# Patient Record
Sex: MALE | Race: ASIAN | NOT HISPANIC OR LATINO | URBAN - METROPOLITAN AREA
[De-identification: names, ages, dates, MRNs, and addresses within clinical notes are randomized per-mention and may not be internally consistent; named-entity substitution may affect disease eponyms.]

---

## 2017-09-13 ENCOUNTER — EMERGENCY (EMERGENCY)
Facility: HOSPITAL | Age: 37
LOS: 1 days | Discharge: HOME | End: 2017-09-13
Attending: EMERGENCY MEDICINE
Payer: COMMERCIAL

## 2017-09-13 VITALS
OXYGEN SATURATION: 98 % | HEIGHT: 68 IN | DIASTOLIC BLOOD PRESSURE: 110 MMHG | TEMPERATURE: 98 F | RESPIRATION RATE: 18 BRPM | SYSTOLIC BLOOD PRESSURE: 159 MMHG | HEART RATE: 75 BPM | WEIGHT: 175.05 LBS

## 2017-09-13 DIAGNOSIS — S00.81XA ABRASION OF OTHER PART OF HEAD, INITIAL ENCOUNTER: ICD-10-CM

## 2017-09-13 DIAGNOSIS — S80.211A ABRASION, RIGHT KNEE, INITIAL ENCOUNTER: ICD-10-CM
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
## 2017-09-13 DIAGNOSIS — S60.512A ABRASION OF LEFT HAND, INITIAL ENCOUNTER: ICD-10-CM

## 2017-09-13 DIAGNOSIS — S60.511A ABRASION OF RIGHT HAND, INITIAL ENCOUNTER: ICD-10-CM
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
## 2017-09-13 PROCEDURE — 99284 EMERGENCY DEPT VISIT MOD MDM: CPT

## 2017-09-13 PROCEDURE — 70486 CT MAXILLOFACIAL W/O DYE: CPT | Mod: 26

## 2017-09-13 PROCEDURE — 70450 CT HEAD/BRAIN W/O DYE: CPT | Mod: 26

## 2017-09-13 NOTE — ED ADULT TRIAGE NOTE - CHIEF COMPLAINT QUOTE
as per pt tripped and fell tonight,fell face down,abrasions on right side of face and nose,right hand abrasions and left knee abarions, loose tooth no loc,dizziness

## 2017-09-13 NOTE — ED PROVIDER NOTE - PHYSICAL EXAMINATION
Afebrile, hemodynamically stable  NAD, well appearing  Abrasion to R forehead and R cheek  No CTL spine TTP  EOMI, anicteric, no TTP over orbital rims, PERRL  MMM, noted tooth #8 chip without enamel/dentin visible, TTP of that tooth without mobility  RRR, nml S1/S2, no m/r/g  Lungs CTAB, no w/r/r  Abd soft, NT, ND, nml BS, no rebound or guarding  AAO, CN's 3-12 intact, motor 5/5 and sens symmetric in all extrems, nml gait, F-N nml  MARTINEZ spontaneously, no leg cyanosis or edema. R knuckle abrasions, L knee abrasion. Full ROM/motor/sensation of all RUE and LLE.  Skin warm, well perfused, no rashes or hives

## 2017-09-13 NOTE — ED PROVIDER NOTE - MEDICAL DECISION MAKING DETAILS
Fall from standing. Abrasions to face, no deformity and CT head/face unremarkable. No focal neuro deficits, nml gait. No sign of entrapment. Full ROM/neurovasculature of b/l UE's and LE's. Noted type I dental fracture and no apparent dislocation. Given Tdap, discharged with instructions for dentist f/u in the AM and PMD f/u.   Of note, accepted HIV testing but due to prolonged time before collection stated would no longer like it to be drawn.

## 2017-09-13 NOTE — ED PROVIDER NOTE - OBJECTIVE STATEMENT
36 y/o M pt w/ no significant PMHx presents to ED c/o headache and abrasions s/p mechanical fall at 19:30 today. Pt states that he tripped and fell on uneven concrete; pt hit his face on the concrete along w/ his L knee and b/l hands. Pt has abrasions to his R face, R hand, L hand, and L knee. Pt denies LOC, dizziness, vomiting, or any other complaints. NKDA. 38 y/o M pt w/ no significant PMHx presents to ED c/o headache and abrasions s/p mechanical fall at 19:30 today. Pt states that he tripped and fell on uneven concrete; pt hit his face on the concrete along w/ his L knee and b/l hands. Pt has abrasions to his R face, R hand, L hand, and L knee. Had dizziness when fell which is absent now. Pt denies LOC, blurry vision, focal weakness, vomiting, or any other complaints. NKDA.

## 2017-09-13 NOTE — ED PROVIDER NOTE - CARE PLAN
Principal Discharge DX:	Fall, initial encounter  Secondary Diagnosis:	Facial abrasion, initial encounter  Secondary Diagnosis:	Abrasion of hand, left, initial encounter

## 2017-09-14 PROCEDURE — 90471 IMMUNIZATION ADMIN: CPT

## 2017-09-14 PROCEDURE — 90715 TDAP VACCINE 7 YRS/> IM: CPT

## 2017-09-14 PROCEDURE — 70486 CT MAXILLOFACIAL W/O DYE: CPT

## 2017-09-14 PROCEDURE — 70450 CT HEAD/BRAIN W/O DYE: CPT

## 2017-09-14 PROCEDURE — 99284 EMERGENCY DEPT VISIT MOD MDM: CPT | Mod: 25

## 2017-09-14 RX ORDER — TETANUS TOXOID, REDUCED DIPHTHERIA TOXOID AND ACELLULAR PERTUSSIS VACCINE, ADSORBED 5; 2.5; 8; 8; 2.5 [IU]/.5ML; [IU]/.5ML; UG/.5ML; UG/.5ML; UG/.5ML
0.5 SUSPENSION INTRAMUSCULAR ONCE
Qty: 0 | Refills: 0 | Status: COMPLETED | OUTPATIENT
Start: 2017-09-14 | End: 2017-09-14

## 2017-09-14 RX ADMIN — TETANUS TOXOID, REDUCED DIPHTHERIA TOXOID AND ACELLULAR PERTUSSIS VACCINE, ADSORBED 0.5 MILLILITER(S): 5; 2.5; 8; 8; 2.5 SUSPENSION INTRAMUSCULAR at 00:13

## 2022-08-23 ENCOUNTER — APPOINTMENT (OUTPATIENT)
Dept: HEPATOLOGY | Facility: CLINIC | Age: 42
End: 2022-08-23

## 2022-08-23 VITALS
BODY MASS INDEX: 30.31 KG/M2 | RESPIRATION RATE: 16 BRPM | DIASTOLIC BLOOD PRESSURE: 119 MMHG | HEART RATE: 82 BPM | OXYGEN SATURATION: 94 % | TEMPERATURE: 97.7 F | HEIGHT: 68 IN | WEIGHT: 200 LBS | SYSTOLIC BLOOD PRESSURE: 184 MMHG

## 2022-08-23 DIAGNOSIS — E80.6 OTHER DISORDERS OF BILIRUBIN METABOLISM: ICD-10-CM

## 2022-08-23 DIAGNOSIS — Z00.00 ENCOUNTER FOR GENERAL ADULT MEDICAL EXAMINATION W/OUT ABNORMAL FINDINGS: ICD-10-CM

## 2022-08-23 DIAGNOSIS — E66.9 OBESITY, UNSPECIFIED: ICD-10-CM

## 2022-08-23 DIAGNOSIS — M10.9 GOUT, UNSPECIFIED: ICD-10-CM

## 2022-08-23 DIAGNOSIS — I10 ESSENTIAL (PRIMARY) HYPERTENSION: ICD-10-CM

## 2022-08-23 DIAGNOSIS — R73.03 PREDIABETES.: ICD-10-CM

## 2022-08-23 DIAGNOSIS — R76.8 OTHER SPECIFIED ABNORMAL IMMUNOLOGICAL FINDINGS IN SERUM: ICD-10-CM

## 2022-08-23 PROCEDURE — 99204 OFFICE O/P NEW MOD 45 MIN: CPT

## 2022-08-30 PROBLEM — R76.8 AUTOANTIBODY TITER POSITIVE: Status: ACTIVE | Noted: 2022-08-23

## 2022-08-30 PROBLEM — R73.03 PREDIABETES: Status: ACTIVE | Noted: 2022-08-30

## 2022-08-30 LAB
A1AT PHENOTYP SERPL-IMP: NORMAL
A1AT SERPL-MCNC: 131 MG/DL
AFP-TM SERPL-MCNC: 2.2 NG/ML
ALBUMIN SERPL ELPH-MCNC: 5 G/DL
ALP BLD-CCNC: 90 U/L
ALT SERPL-CCNC: 107 U/L
ANA PAT FLD IF-IMP: ABNORMAL
ANA SER IF-ACNC: ABNORMAL
ANION GAP SERPL CALC-SCNC: 17 MMOL/L
AST SERPL-CCNC: 42 U/L
BASOPHILS # BLD AUTO: 0.05 K/UL
BASOPHILS NFR BLD AUTO: 0.8 %
BILIRUB DIRECT SERPL-MCNC: 0.3 MG/DL
BILIRUB SERPL-MCNC: 1.7 MG/DL
BUN SERPL-MCNC: 15 MG/DL
CALCIUM SERPL-MCNC: 9.6 MG/DL
CHLORIDE SERPL-SCNC: 105 MMOL/L
CO2 SERPL-SCNC: 22 MMOL/L
CREAT SERPL-MCNC: 1.11 MG/DL
DEPRECATED KAPPA LC FREE/LAMBDA SER: 1.17 RATIO
EGFR: 86 ML/MIN/1.73M2
EOSINOPHIL # BLD AUTO: 0.21 K/UL
EOSINOPHIL NFR BLD AUTO: 3.5 %
FERRITIN SERPL-MCNC: 160 NG/ML
FULL GENE SEQUENCE RESULT: NORMAL
GLUCOSE SERPL-MCNC: 113 MG/DL
HBV CORE IGG+IGM SER QL: NONREACTIVE
HBV SURFACE AB SERPL IA-ACNC: <3 MIU/ML
HBV SURFACE AG SER QL: NONREACTIVE
HCT VFR BLD CALC: 47.5 %
HCV AB SER QL: NONREACTIVE
HCV S/CO RATIO: 0.1 S/CO
HEPATITIS A IGG ANTIBODY: REACTIVE
HGB BLD-MCNC: 16.9 G/DL
IGA SER QL IEP: 194 MG/DL
IGG SER QL IEP: 1083 MG/DL
IGM SER QL IEP: 129 MG/DL
IMM GRANULOCYTES NFR BLD AUTO: 0.3 %
INR PPP: 0.98 RATIO
INTERPRETATION PGDFRB: NORMAL
IRON SATN MFR SERPL: 25 %
IRON SERPL-MCNC: 79 UG/DL
KAPPA LC CSF-MCNC: 1.37 MG/DL
KAPPA LC SERPL-MCNC: 1.6 MG/DL
LKM AB SER QL IF: <20.1 UNITS
LYMPHOCYTES # BLD AUTO: 2.88 K/UL
LYMPHOCYTES NFR BLD AUTO: 48.1 %
Lab: NORMAL
MAN DIFF?: NORMAL
MCHC RBC-ENTMCNC: 30.2 PG
MCHC RBC-ENTMCNC: 35.6 GM/DL
MCV RBC AUTO: 84.8 FL
MONOCYTES # BLD AUTO: 0.33 K/UL
MONOCYTES NFR BLD AUTO: 5.5 %
NEUTROPHILS # BLD AUTO: 2.5 K/UL
NEUTROPHILS NFR BLD AUTO: 41.8 %
PLATELET # BLD AUTO: 265 K/UL
POTASSIUM SERPL-SCNC: 4.1 MMOL/L
PROT SERPL-MCNC: 7.5 G/DL
PT BLD: 11.4 SEC
RBC # BLD: 5.6 M/UL
RBC # FLD: 12.9 %
REF LAB TEST METHOD: NORMAL
REVIEWED BY: NORMAL
SMOOTH MUSCLE AB SER QL IF: NORMAL
SODIUM SERPL-SCNC: 144 MMOL/L
SOLUBLE LIVER IGG SER IA-ACNC: 0.7
TA REPEAT RESULT: NORMAL
TEST PERFORMANCE INFO SPEC: NORMAL
TIBC SERPL-MCNC: 318 UG/DL
UIBC SERPL-MCNC: 239 UG/DL
WBC # FLD AUTO: 5.99 K/UL

## 2022-08-30 RX ORDER — METOPROLOL SUCCINATE 25 MG/1
25 TABLET, EXTENDED RELEASE ORAL DAILY
Refills: 0 | Status: ACTIVE | COMMUNITY

## 2022-08-30 NOTE — ASSESSMENT
[FreeTextEntry1] : 43 yo M with obesity, hypertension, prediabetes (with HbA1c 5.7% in 11/2021), gout (currently off allopurinol for >6 months), and left eye cataract, who is being seen for further evaluation and management of chronic liver enzyme elevations likely secondary to nonalcoholic steatohepatitis (CASTELLON).\par \par He has multiple personal risk factors for CASTELLON including metabolic syndrome and had increased hepatic echogenicity on sonograms as well as elevated CAP score on FibroScan all suggestive of hepatic steatosis. He has no recent significant alcohol history. \par \par His AST and ALT elevations have waxed and waned but overall appear to be similar for several years, though may have been slightly higher in 7/2021 when he was still on allopurinol. Additional labs ordered today to rule out infectious hepatitis or autoimmune hepatitis, especially as he has a history of positive autoantibodies on prior tests. Quantitative immunoglobulins also ordered.\par \par His most recent labs (from 1/2022) are notable for mild hyperbilirubinemia, but otherwise there has not been suggestive of cirrhosis and this could be unrelated and due to a benign process such as Gilbert syndrome. Bilirubin fractionation and UGT1A1 gene mutation analysis ordered today for further evaluation.\par \par Given >1 year since last elastography, MR elastography ordered today for re-staging as well as to quantify hepatic steatosis.\par \par Pending the results of his labs and MR elastography, if there is concern for possible alternative diagnosis such as autoimmune hepatitis or lupus hepatitis then may need to pursue percutaneous liver biopsy, but will defer for now pending the non-invasive test results.\par \par He will return for follow-up in 1 month to discuss his results and management.

## 2022-08-30 NOTE — REVIEW OF SYSTEMS
[Recent Weight Gain (___ Lbs)] : recent [unfilled] ~Ulb weight gain [As noted in HPI] : as noted in HPI [Lower Ext Edema] : lower extremity edema [As Noted in HPI] : as noted in HPI [Arthralgias] : arthralgias [Joint Stiffness] : joint stiffness [Negative] : Heme/Lymph

## 2022-08-30 NOTE — CONSULT LETTER
[Dear  ___] : Dear  [unfilled], [( Thank you for referring [unfilled] for consultation for _____ )] : Thank you for referring [unfilled] for consultation for [unfilled] [Please see my note below.] : Please see my note below. [Consult Closing:] : Thank you very much for allowing me to participate in the care of this patient.  If you have any questions, please do not hesitate to contact me. [FreeTextEntry2] : Dr. Yoni Reilly [FreeTextEntry3] : Sincerely,\par \par Crystal Ceravntes M.D., Ph.D.\par Director, Women's Liver Health Program, [facility]\par Transplant Hepatology, [facility]\par  of Medicine\par [facility] [employer] [facility]\par [address]\[place] [address]\par Tel: [phone]\par Fax: [phone]\par Cell: [phone]\par E-mail: [email or web address]

## 2022-08-30 NOTE — HISTORY OF PRESENT ILLNESS
[de-identified] : Mr. Martinez is a 43 yo M with obesity, hypertension, prediabetes (with HbA1c 5.7% in 2021), gout (diagnosed in his 30s), and left eye cataract, who is being seen for further evaluation and management of chronic liver enzyme elevations and nonalcoholic fatty liver disease (NAFLD).\par \par PCP: Dr. Satinder Mcwilliams\par GI: Dr. Hill Whitt\par Rheumatology: Dr. Yoni Reilly = referring physician\par \par FibroScan (21, [facility]): Median liver stiffness 7.6 kPA (consistent with F1-2 fibrosis) and CAP score 319 dB/m (consistent with S1 steatosis).\par \par Abdominal US (21): Increased hepatic echogenicity with areas of focal fat sparing near the gallbladder.\par \par He reports having had elevated liver enzymes since around , near the time that he had a gout flate that he attributes to drinking energy drinks at the time. He has been on and off allopurinol since his mid 30s but has been off it for >6 months recently due to concern that it was affecting his liver enzymes. He was referred to GI Dr. Whitt and underwent FibroScan (above). He has been on telmisartan and metoprolol for about 1 year for hypertension, but says that he only takes them about twice weekly on average if he "feels different". He does not monitor his BPs at home. He denies taking herbal/dietary supplements. He is lactose intolerant but says that if he consumes dairy products, he develops "stiff joints" rather than GI symptoms. \par \par He used to drink alcohol socially but cut down significantly after being diagnosed with gout. Nowadays, he typically has 1-2 glasses of wine every 1-2 months. \par \par He used to weigh 155 lbs in college, then 175-180 lbs for several years, but has had gradual weight gain particularly in the past 3-4 years. His heaviest weight was 205-210 lbs. He does not exercise due to pain related to a bone spur. He is doing some physical therapy. He also has occasional pedal edema that he describes as "inflammation" that causes his shoes to feel too tight at times. He eats vegetarian on  and  for Alevism reasons, but otherwise often eats meals consisting of chicken and vegetables at home. He does most of the cooking for his family but admits that occasionally when busy they order pizza.\par \par He has no known family history of liver disease or diabetes. His father is alive and has hypertension. His mother is alive and has arthritis. His sister is a breast cancer survivor. His brother is alive and healthy. His son is healthy. 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 He had been walking some for his train commute, but has been working remotely from home more often recently. He is  and lives with his wife and their 3-year-old son.\par \par Liver enzyme trends:\par 2016: AST 34, ALT 82\par 21: AST 55, \par 21: AST 34, ALT 90\par 22: ALP 92, AST 34, ALT 96, TB 1.3, TP 7.4, Alb 4.8, Plt 234\par \par Prior labs: HBsAg non-reactive, HCV Ab non-reactive, BRETT positive with 1:40 titer () -> negative (2021), dsDNA positive, ASMA negative (), ceruloplasmin 21, A1AT 126

## 2022-09-01 LAB — PHOSPHATIDYETHANOL (PETH), WHOLE BLOOD: NEGATIVE NG/ML

## 2022-09-12 ENCOUNTER — NON-APPOINTMENT (OUTPATIENT)
Age: 42
End: 2022-09-12

## 2022-09-14 ENCOUNTER — TRANSCRIPTION ENCOUNTER (OUTPATIENT)
Age: 42
End: 2022-09-14

## 2022-09-21 ENCOUNTER — TRANSCRIPTION ENCOUNTER (OUTPATIENT)
Age: 42
End: 2022-09-21

## 2022-09-30 PROBLEM — Z00.00 ENCOUNTER FOR PREVENTIVE HEALTH EXAMINATION: Status: ACTIVE | Noted: 2022-09-30

## 2022-10-07 ENCOUNTER — APPOINTMENT (OUTPATIENT)
Dept: MRI IMAGING | Facility: IMAGING CENTER | Age: 42
End: 2022-10-07

## 2022-10-07 ENCOUNTER — RESULT REVIEW (OUTPATIENT)
Age: 42
End: 2022-10-07

## 2022-10-07 ENCOUNTER — OUTPATIENT (OUTPATIENT)
Dept: OUTPATIENT SERVICES | Facility: HOSPITAL | Age: 42
LOS: 1 days | End: 2022-10-07
Payer: COMMERCIAL

## 2022-10-07 DIAGNOSIS — R74.8 ABNORMAL LEVELS OF OTHER SERUM ENZYMES: ICD-10-CM

## 2022-10-07 DIAGNOSIS — E80.6 OTHER DISORDERS OF BILIRUBIN METABOLISM: ICD-10-CM

## 2022-10-07 DIAGNOSIS — K75.81 NONALCOHOLIC STEATOHEPATITIS (NASH): ICD-10-CM

## 2022-10-07 DIAGNOSIS — R76.8 OTHER SPECIFIED ABNORMAL IMMUNOLOGICAL FINDINGS IN SERUM: ICD-10-CM

## 2022-10-07 PROCEDURE — 74183 MRI ABD W/O CNTR FLWD CNTR: CPT | Mod: 26

## 2022-10-07 PROCEDURE — 76391 MR ELASTOGRAPHY: CPT

## 2022-10-07 PROCEDURE — 76391 MR ELASTOGRAPHY: CPT | Mod: 26

## 2022-10-07 PROCEDURE — 74183 MRI ABD W/O CNTR FLWD CNTR: CPT

## 2022-10-07 PROCEDURE — A9585: CPT

## 2022-10-26 ENCOUNTER — APPOINTMENT (OUTPATIENT)
Dept: HEPATOLOGY | Facility: CLINIC | Age: 42
End: 2022-10-26

## 2022-10-26 VITALS
WEIGHT: 204 LBS | HEART RATE: 83 BPM | TEMPERATURE: 97.8 F | BODY MASS INDEX: 30.92 KG/M2 | OXYGEN SATURATION: 97 % | HEIGHT: 68 IN

## 2022-10-26 DIAGNOSIS — R74.8 ABNORMAL LEVELS OF OTHER SERUM ENZYMES: ICD-10-CM

## 2022-10-26 DIAGNOSIS — E80.4 GILBERT SYNDROME: ICD-10-CM

## 2022-10-26 PROCEDURE — 99214 OFFICE O/P EST MOD 30 MIN: CPT

## 2022-10-26 RX ORDER — TELMISARTAN 20 MG/1
20 TABLET ORAL DAILY
Refills: 0 | Status: DISCONTINUED | COMMUNITY
End: 2022-10-26

## 2022-10-26 RX ORDER — TELMISARTAN 40 MG/1
40 TABLET ORAL
Qty: 30 | Refills: 0 | Status: ACTIVE | COMMUNITY
Start: 2022-09-15

## 2022-10-26 NOTE — REVIEW OF SYSTEMS
[Recent Weight Gain (___ Lbs)] : recent [unfilled] ~Ulb weight gain [As Noted in HPI] : as noted in HPI [Arthralgias] : arthralgias [Joint Stiffness] : joint stiffness [Negative] : Heme/Lymph

## 2022-11-10 VITALS — SYSTOLIC BLOOD PRESSURE: 145 MMHG | DIASTOLIC BLOOD PRESSURE: 90 MMHG

## 2022-11-10 PROBLEM — E80.4 GILBERT SYNDROME: Status: ACTIVE | Noted: 2022-11-10

## 2022-11-10 NOTE — HISTORY OF PRESENT ILLNESS
[de-identified] : Mr. Martinez is a 43 yo M with obesity, hypertension, prediabetes (with HbA1c 5.7% in 2021), gout (diagnosed in his 30s), and left eye cataract, who is being seen for further evaluation and management of chronic liver enzyme elevations and nonalcoholic fatty liver disease (NAFLD).\par \par PCP: Dr. Satinder Mcwilliams\par GI: Dr. Hill Whitt\par Rheumatology: Dr. Yoni Reilly = referring physician\par \par FibroScan (21, [facility]): Median liver stiffness 7.6 kPA (consistent with F1-2 fibrosis) and CAP score 319 dB/m (consistent with S1 steatosis).\par \par Abdominal US (21): Increased hepatic echogenicity with areas of focal fat sparing near the gallbladder.\par \par MRI abdomen with and without contrast with MR elastography (10/7/22): Normal liver morphology. Diffuse moderate to severe steatosis (with hepatic fat fraction 21%) with focal sparing of the gallbladder fossa. No focal hepatic lesion. No hepatic iron deposition. Hepatic stiffness 4.0 kPA (consistent with F3-4 fibrosis). Normal caliber bile ducts. Normal gallbladder. Normal spleen. No ascites. Patent hepatic and portal veins.\par \par He was last seen by me on 22 and is here today for follow-up and to discuss the results of his MR elastography (above). His weight is up 4 lbs since his last visit. He admits that he only takes his blood pressure medications about 4 times/week, not daily. He is trying to exercise but gets stiffness after and has trouble sleeping too with early awakening.\par \par He used to drink alcohol socially but cut down significantly after being diagnosed with gout. Nowadays, he typically has 1-2 glasses of wine every 1-2 months. \par \par He eats vegetarian on  and  for Faith reasons, but otherwise often eats meals consisting of chicken and vegetables at home. He does most of the cooking for his family but admits that occasionally when busy they order pizza.\par \par He has no known family history of liver disease or diabetes. His father is alive and has hypertension. His mother is alive and has arthritis. His sister is a breast cancer survivor. His brother is alive and healthy. His son is healthy. 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 He had been walking some for his train commute, but has been working remotely from home more often recently. He is  and lives with his wife and their 3-year-old son.

## 2022-11-10 NOTE — ASSESSMENT
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 Prior laboratory work-up for other causes of chronic liver disease was negative apart from positive BRETT with 1:160 titer and speckled pattern, but with other autoimmune serologies negative and IgG within normal limits, with low suspicion for autoimmune hepatitis. Will defer liver biopsy for now, especially in light of his multiple personal risk factors for CASTELLON and recent MR elastography showing moderate to severe steatosis.\par \par Based on his MR elastography (10/7/22), he appears to have advanced hepatic fibrosis (F3-4) though with non-cirrhotic liver morphology, no radiologic features of portal hypertension, normal liver synthetic function on recent labs (apart from chronic indirect hyperbilirubinemia consistent with his Gilbert syndrome), and normal platelet count >200k. He has no history of overt hepatic decompensations (i.e., no history of variceal hemorrhage, ascites, or hepatic encephalopathy).\par \par Today, we discussed his elastography evidence of advanced hepatic fibrosis and concern that he is at high risk of progression to cirrhosis (if not already cirrhotic). I recommended a multimodal approach to treating his CASTELLON, including: (1) lifestyle modifications (detailed below) and (2) pharmacologic therapy. We discussed that currently, there are no FDA-approved pharmacologic treatments for CASTELLON, but that this may change within the next 1-2 years as a number of promising drugs are currently being investigated in clinical trials. We discussed the possibility of referral for consideration of participation in a clinical trial but he declined. We discussed the potential benefits, risks, and side effects of vitamin E, pioglitazone, and off label semaglutide. He is interested in starting vitamin E 800 IU po daily today, but would be open to reconsidering semaglutide or a clinical trial if there is no improvement in his CASTELLON on repeat evaluation with MR elastography in 6 months.\par \par We discussed that lifestyle modifications are crucial for management of CASTELLON. I recommended gradual weight loss of at least 10% of his body weight. I recommended dietary changes including coffee consumption (up to 3-4 cups/day if desired), adherence to a Mediterranean style diet with increased consumption of vegetables and lean proteins (with plate method discussed today), avoidance of red meat and high fructose corn syrup, and avoidance of calorie-containing beverages. I recommended moderate intensity exercise for a minimum of 20-30 minutes at least 3 times per week. These changes have been shown to lead to regression or even resolution of steatosis, inflammation, and even fibrosis in some patients.\par \par He is HAV immune but HBV non-immune and would benefit from vaccination. Given concern for advanced fibrosis, Mr. MANCILLA was counseled to: abstain from alcohol; avoid use of herbal and dietary supplements due to potential hepatotoxicity; limit use of acetaminophen to <2 grams per day; avoid use of nonsteroidal antiinflammatory drugs (NSAIDs) as these can lead to diuretic resistance and precipitate renal dysfunction in patients with advanced liver disease; avoid eating any unpasteurized dairy products; avoid eating any raw or undercooked eggs, fish, poultry, or meat; and avoid eating raw/steamed oysters or other shellfish to avoid risk of Vibrio infection.\par \par Next follow-up: 6 months

## 2022-11-10 NOTE — CONSULT LETTER
[Dear  ___] : Dear  [unfilled], [Please see my note below.] : Please see my note below. [Consult Closing:] : Thank you very much for allowing me to participate in the care of this patient.  If you have any questions, please do not hesitate to contact me. [Courtesy Letter:] : I had the pleasure of seeing your patient, [unfilled], in my office today. [FreeTextEntry2] : Dr. Yoni Reilly [FreeTextEntry3] : Sincerely,\par \par Crystal Cervantes M.D., Ph.D.\par Director, Women's Liver Health Program, [facility]\par Transplant Hepatology, [facility]\par  of Medicine\par [facility] [employer]\par [address]\[place] [address]\par Tel: [phone]\par Fax: [phone]\par Cell: [phone]\par E-mail: [email or web address] [FreeTextEntry1] : Based on recent MR elastography, he appears to have nonalcoholic steatohepatitis (CASTELLON) with concern for advanced hepatic fibrosis (F3-4), though without overt evidence of cirrhosis by history, exam, labs, or radiology yet. He also has Gilbert syndrome that is accounting for his indirect hyperbilirubinemia. I am working with him to try to treat his CASTELLON through a combination of healthy lifestyle changes and pharmacotherapy. He recently opted to start vitamin E 800 IU po daily, though we will consider semaglutide or referral for a clinical trial if his disease is not improving within the next 6 months.\par \par There is no hepatic contraindication to his re-starting allopurinol, so long as we continue to monitor his liver chemistries for any changes once he is re-challenged with the medication.

## 2023-05-01 ENCOUNTER — NON-APPOINTMENT (OUTPATIENT)
Age: 43
End: 2023-05-01

## 2023-06-21 ENCOUNTER — APPOINTMENT (OUTPATIENT)
Dept: HEPATOLOGY | Facility: CLINIC | Age: 43
End: 2023-06-21
Payer: COMMERCIAL

## 2023-06-21 VITALS
HEART RATE: 76 BPM | HEIGHT: 68 IN | SYSTOLIC BLOOD PRESSURE: 165 MMHG | DIASTOLIC BLOOD PRESSURE: 115 MMHG | OXYGEN SATURATION: 97 % | RESPIRATION RATE: 14 BRPM | TEMPERATURE: 97 F | WEIGHT: 185 LBS | BODY MASS INDEX: 28.04 KG/M2

## 2023-06-21 LAB
AFP-TM SERPL-MCNC: 1.8 NG/ML
ALBUMIN SERPL ELPH-MCNC: 4.8 G/DL
ALP BLD-CCNC: 91 U/L
ALT SERPL-CCNC: 24 U/L
ANION GAP SERPL CALC-SCNC: 13 MMOL/L
AST SERPL-CCNC: 19 U/L
BILIRUB DIRECT SERPL-MCNC: 0.3 MG/DL
BILIRUB SERPL-MCNC: 1.9 MG/DL
BUN SERPL-MCNC: 14 MG/DL
CALCIUM SERPL-MCNC: 9.5 MG/DL
CHLORIDE SERPL-SCNC: 105 MMOL/L
CHOLEST SERPL-MCNC: 221 MG/DL
CO2 SERPL-SCNC: 26 MMOL/L
CREAT SERPL-MCNC: 1.13 MG/DL
EGFR: 83 ML/MIN/1.73M2
ESTIMATED AVERAGE GLUCOSE: 108 MG/DL
GLUCOSE SERPL-MCNC: 102 MG/DL
HBA1C MFR BLD HPLC: 5.4 %
HDLC SERPL-MCNC: 44 MG/DL
INR PPP: 0.94 RATIO
LDLC SERPL CALC-MCNC: 141 MG/DL
NONHDLC SERPL-MCNC: 178 MG/DL
POTASSIUM SERPL-SCNC: 4 MMOL/L
PROT SERPL-MCNC: 7.1 G/DL
PT BLD: 11 SEC
SODIUM SERPL-SCNC: 143 MMOL/L
TRIGL SERPL-MCNC: 184 MG/DL
URATE SERPL-MCNC: 7.3 MG/DL

## 2023-06-21 PROCEDURE — 99213 OFFICE O/P EST LOW 20 MIN: CPT

## 2023-06-21 NOTE — CONSULT LETTER
[Dear  ___] : Dear  [unfilled], [Courtesy Letter:] : I had the pleasure of seeing your patient, [unfilled], in my office today. [Please see my note below.] : Please see my note below. [Consult Closing:] : Thank you very much for allowing me to participate in the care of this patient.  If you have any questions, please do not hesitate to contact me. [FreeTextEntry2] : Dr. Yoni Reilly [FreeTextEntry1] : Based on recent MR elastography, he appears to have nonalcoholic steatohepatitis (CASTELLON) with concern for advanced hepatic fibrosis (F3-4), though without overt evidence of cirrhosis by history, exam, labs, or radiology yet. He also has Gilbert syndrome that is accounting for his indirect hyperbilirubinemia. I am working with him to try to treat his CASTELLON through a combination of healthy lifestyle changes and pharmacotherapy. He recently opted to start vitamin E 800 IU po daily, though we will consider semaglutide or referral for a clinical trial if his disease is not improving within the next 6 months.\par \par There is no hepatic contraindication to his re-starting allopurinol, so long as we continue to monitor his liver chemistries for any changes once he is re-challenged with the medication. [FreeTextEntry3] : Sincerely,\par \par Crystal Cervantes M.D., Ph.D.\par Director, Women's Liver Health Program, [facility]\par Transplant Hepatology, [facility]\par  of Medicine\par [employer] at [facility]\par [address]\[place] [address]\par Tel: [phone]\par Fax: [phone]\par Cell: [phone]\par E-mail: [email or web address]

## 2023-06-21 NOTE — HISTORY OF PRESENT ILLNESS
[de-identified] : Mr. 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 Satinder Mcwilliasm\par GI: Dr. Hill Whitt\par Rheumatology: Dr. Yoni Reilly = referring physician\par \par FibroScan (21, [facility]): Median liver stiffness 7.6 kPA (consistent with F1-2 fibrosis) and CAP score 319 dB/m (consistent with S1 steatosis).\par \par Abdominal US (21): Increased hepatic echogenicity with areas of focal fat sparing near the gallbladder.\par \par MRI abdomen with and without contrast with MR elastography (10/7/22): Normal liver morphology. Diffuse moderate to severe steatosis (with hepatic fat fraction 21%) with focal sparing of the gallbladder fossa. No focal hepatic lesion. No hepatic iron deposition. Hepatic stiffness 4.0 kPA (consistent with F3-4 fibrosis). Normal caliber bile ducts. Normal gallbladder. Normal spleen. No ascites. Patent hepatic and portal veins.\par \par He was last seen by me on 10/26/22 and is here today for follow-up. He remains off allopurinol since prior to his last visit. No new medications. He had mild COVID-19 in 2022 and says he wasn't eating much then, but used it as a way to start some subsequent healthy diet changes including smaller portions. He is doing intermittent fasting with eating from 12pm - 8pm daily. He occasionally slips and eats some pumpkin seeds in the mornings, but is trying to limit carbs. His weight gradually went down to a low of 181 lbs, but he has since regained some weight. He also started going to the gym and doing weight training, but developed stiffness in his shoulders and then took a break. This coincided with him regaining some weight. He has a new puppy and is trying to walk 1/2-1 mile in the mornings daily before work.\par \par He used to drink alcohol socially but cut down significantly after being diagnosed with gout. Nowadays, he typically has 1-2 glasses of wine every 1-2 months. \par \par He eats vegetarian on  and  for Uatsdin reasons, but otherwise often eats meals consisting of chicken and vegetables at home. He does most of the cooking for his family.\par \par He has no known family history of liver disease or diabetes. His father is alive and has hypertension. His mother is alive and has arthritis. His sister is a breast cancer survivor. His brother is alive and healthy. His son is healthy. 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 He had been walking some for his train commute, but has been working remotely from home more often recently. He is  and lives with his wife and their 3-year-old son.

## 2023-06-21 NOTE — REASON FOR VISIT
Await final culture report per [facility] ED Lab Result protocol.  RN confirmed Patient was prescribed antibiotic from ED visit. [Follow-Up: _____] : a [unfilled] follow-up visit

## 2023-06-21 NOTE — ASSESSMENT
[FreeTextEntry1] : 42 yo M with obesity, hypertension, prediabetes, gout (currently off allopurinol), and Gilbert syndrome (with homozygosity for the TA7 promoter variant of the UGT1A1 gene), with chronic elevations in AST and ALT likely secondary to nonalcoholic steatohepatitis (CASTELLON).\par \par He has no significant alcohol history. Prior laboratory work-up for other causes of chronic liver disease was negative apart from positive BRETT with 1:160 titer and speckled pattern, but with other autoimmune serologies negative and IgG within normal limits, with low suspicion for autoimmune hepatitis. Will re-check labs today and if liver enzymes are increasing, will consider biopsy. This was previously deferred based on high suspicion for CASTELLON given his personal risk factors and prior MR elastography findings, with low suspicion for AIH.\par \par Based on his prior MR elastography (10/7/22), he appears to have advanced hepatic fibrosis (F3-4) though with non-cirrhotic liver morphology, no radiologic features of portal hypertension, normal liver synthetic function on recent labs (apart from chronic indirect hyperbilirubinemia consistent with his Gilbert syndrome), and normal platelet count >200k. He has no history of overt hepatic decompensations (i.e., no history of variceal hemorrhage, ascites, or hepatic encephalopathy). Repeat labs ordered today for re-evaluation.\par \par At his last visit, we discussed his elastography evidence of advanced hepatic fibrosis and concern that he is at high risk of progression to cirrhosis (if not already cirrhotic). I recommended a multimodal approach to treating his CASTELLON, including: (1) lifestyle modifications (detailed below) and (2) pharmacologic therapy. We discussed that currently, there are no FDA-approved pharmacologic treatments for CASTELLON, but that this may change within the next 1-2 years as a number of promising drugs are currently being investigated in clinical trials. We discussed the possibility of referral for consideration of participation in a clinical trial, which he declined at the time, and also discussed the potential benefits and risks of off label semaglutide, which he also declined at the time. He is currently on vitamin E 800 IU po daily (10/26/22- ). We will repeat another MR elastography (ordered previously but not yet done) and, depending on those results as well as his repeat labs ordered today, re-discuss whether to consider a medication change including possible referral for biopsy and RCT.\par \par We have discussed that lifestyle modifications are crucial for management of CASTELLON. I congratulated him on the healthy changes he has made since his last vist. I recommended continued gradual weight loss. I recommended dietary changes including coffee consumption (up to 3-4 cups/day if desired), adherence to a Mediterranean style diet with increased consumption of vegetables and lean proteins (with plate method previously discussed), avoidance of red meat and high fructose corn syrup, and avoidance of calorie-containing beverages. I recommended moderate intensity exercise for a minimum of 20-30 minutes at least 3 times per week. These changes have been shown to lead to regression or even resolution of steatosis, inflammation, and even fibrosis in some patients.\par \par He is HAV immune but HBV non-immune and would benefit from vaccinations.\par \par Given concern for advanced fibrosis, Mr. MANCILLA was counseled to: abstain from alcohol; avoid use of herbal and dietary supplements due to potential hepatotoxicity; limit use of acetaminophen to <2 grams per day; avoid use of nonsteroidal antiinflammatory drugs (NSAIDs) as these can lead to diuretic resistance and precipitate renal dysfunction in patients with advanced liver disease; avoid eating any unpasteurized dairy products; avoid eating any raw or undercooked eggs, fish, poultry, or meat; and avoid eating raw/steamed oysters or other shellfish to avoid risk of Vibrio infection.\par \par Next follow-up: 3 months

## 2023-06-23 ENCOUNTER — NON-APPOINTMENT (OUTPATIENT)
Age: 43
End: 2023-06-23

## 2023-07-06 ENCOUNTER — NON-APPOINTMENT (OUTPATIENT)
Age: 43
End: 2023-07-06

## 2023-09-04 ENCOUNTER — NON-APPOINTMENT (OUTPATIENT)
Age: 43
End: 2023-09-04

## 2023-09-06 ENCOUNTER — APPOINTMENT (OUTPATIENT)
Dept: HEPATOLOGY | Facility: CLINIC | Age: 43
End: 2023-09-06
Payer: COMMERCIAL

## 2023-09-06 VITALS
BODY MASS INDEX: 27.74 KG/M2 | RESPIRATION RATE: 14 BRPM | SYSTOLIC BLOOD PRESSURE: 149 MMHG | TEMPERATURE: 97.2 F | WEIGHT: 183 LBS | HEIGHT: 68 IN | HEART RATE: 72 BPM | DIASTOLIC BLOOD PRESSURE: 106 MMHG

## 2023-09-06 DIAGNOSIS — K75.81 NONALCOHOLIC STEATOHEPATITIS (NASH): ICD-10-CM

## 2023-09-06 PROCEDURE — 76981 USE PARENCHYMA: CPT

## 2023-09-06 PROCEDURE — 99214 OFFICE O/P EST MOD 30 MIN: CPT

## 2023-09-10 NOTE — PHYSICAL EXAM
[Non-Tender] : non-tender [Smooth] : smooth [General Appearance - Alert] : alert [General Appearance - In No Acute Distress] : in no acute distress [General Appearance - Well Nourished] : well nourished [General Appearance - Well Developed] : well developed [General Appearance - Well-Appearing] : healthy appearing [Sclera] : the sclera and conjunctiva were normal [Hearing Threshold Finger Rub Not [place]] : hearing was normal [Oropharynx] : the oropharynx was normal [Neck Appearance] : the appearance of the neck was normal [Neck Cervical Mass (___cm)] : no neck mass was observed [Jugular Venous Distention Increased] : there was no jugular-venous distention [Respiration, Rhythm And Depth] : normal respiratory rhythm and effort [Exaggerated Use Of Accessory Muscles For Inspiration] : no accessory muscle use [Auscultation Breath Sounds / Voice Sounds] : lungs were clear to auscultation bilaterally [Heart Rate And Rhythm] : heart rate was normal and rhythm regular [Heart Sounds] : normal S1 and S2 [Murmurs] : no murmurs [Edema] : there was no peripheral edema [Bowel Sounds] : normal bowel sounds [Abdomen Soft] : soft [Abdomen Tenderness] : non-tender [] : no hepato-splenomegaly [Abdomen Mass (___ Cm)] : no abdominal mass palpated [Abdomen Hernia] : no hernia was discovered [Abnormal Walk] : normal gait [Nail Clubbing] : no clubbing  or cyanosis of the fingernails [Involuntary Movements] : no involuntary movements were seen [Skin Color & Pigmentation] : normal skin color and pigmentation [Skin Turgor] : normal skin turgor [Skin Lesions] : no skin lesions [Oriented To Time, Place, And Person] : oriented to person, place, and time [Impaired Insight] : insight and judgment were intact [Affect] : the affect was normal [Mood] : the mood was normal [Memory Recent] : recent memory was not impaired [Memory Remote] : remote memory was not impaired [Scleral Icterus] : No Scleral Icterus [Spider Angioma] : No spider angioma(s) were observed [Abdominal  Ascites] : no ascites [Splenomegaly] : no splenomegaly [Caput Medusae] : no caput medusae observed [Asterixis] : no asterixis observed [Jaundice] : No jaundice [Palmar Erythema] : no Palmar Erythema

## 2023-09-10 NOTE — CONSULT LETTER
[Dear  ___] : Dear  [unfilled], [Courtesy Letter:] : I had the pleasure of seeing your patient, [unfilled], in my office today. [Please see my note below.] : Please see my note below. [Consult Closing:] : Thank you very much for allowing me to participate in the care of this patient.  If you have any questions, please do not hesitate to contact me. [FreeTextEntry2] : Dr. Yoni Reilly [FreeTextEntry1] : Based on recent MR elastography, he appears to have nonalcoholic steatohepatitis (CASTELLON) with concern for advanced hepatic fibrosis (F3-4), though without overt evidence of cirrhosis by history, exam, labs, or radiology yet. He also has Gilbert syndrome that is accounting for his indirect hyperbilirubinemia. I am working with him to try to treat his CASTELLON through a combination of healthy lifestyle changes and pharmacotherapy. He recently opted to start vitamin E 800 IU po daily, though we will consider semaglutide or referral for a clinical trial if his disease is not improving within the next 6 months.\par  \par  There is no hepatic contraindication to his re-starting allopurinol, so long as we continue to monitor his liver chemistries for any changes once he is re-challenged with the medication. [FreeTextEntry3] : Sincerely,  Crystal Cervantes M.D., Ph.D. Director, Women's Liver Health Program, [facility]ology, [facility]  of [facility] [employer] [place] [address] Tel: [phone] Fax: [phone] Cell: [phone] E-mail: [email or web address]

## 2023-09-10 NOTE — ASSESSMENT
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 Repeat labs in 6 months for trends.  He has no significant alcohol history. Prior laboratory work-up for other causes of chronic liver disease was negative apart from positive BRETT with 1:160 titer and speckled pattern, but with other autoimmune serologies negative and IgG within normal limits, with low suspicion for autoimmune hepatitis especially given the interval normalization of his liver enzymes after healthy lifestyle changes as below more consistent with interval improvement of steatohepatitis. Biopsy deferred for now.  Based on his prior MR elastography (10/7/22), there was concern for advanced hepatic fibrosis (F3-4) though with non-cirrhotic liver morphology, no radiologic features of portal hypertension, normal liver synthetic function on recent labs (apart from chronic indirect hyperbilirubinemia consistent with his Gilbert syndrome), and normal platelet count >200k. He has no history of overt hepatic decompensations (i.e., no history of variceal hemorrhage, ascites, or hepatic encephalopathy).  Based on FibroScan done today, he has normal median liver stiffness suggesting possible interval improvement to F0-1 fibrosis. However, given that FibroScan can be less reliable than biopsy or MR elastography (the latter of which was denied by his insurance after being ordered at his last visit), we will plan for a repeat FibroScan with his next visit in 6 months to confirm stability. Additionally, given inability to exclude advanced hepatic fibrosis or early cirrhosis definitively, will plan for repeat US (ordered today) to screen for HCC q6 months.  Given concern for possible advanced hepatic fibrosis based on prior imaging (as above) and concern that he is at high risk of progression to cirrhosis (if not already cirrhotic), I previously recommended a multimodal approach to treating his CASTELLON, including: (1) lifestyle modifications (detailed below) and (2) pharmacologic therapy. We discussed that currently, there are no FDA-approved pharmacologic treatments for CASTELLON, but that this may change within the next 1-2 years as a number of promising drugs are currently being investigated in clinical trials. We discussed the possibility of referral for consideration of participation in a clinical trial, which he declined, and also previously discussed the potential benefits and risks of off label semaglutide, which he also declined. He is currently on vitamin E 800 IU po daily (10/26/22- ).  We have discussed that lifestyle modifications are crucial for management of CASTELLON. I congratulated him on the healthy changes he has made over the past year. I recommended continued gradual weight loss. I recommended dietary changes including coffee consumption (up to 3-4 cups/day if desired), adherence to a Mediterranean style diet with increased consumption of vegetables and lean proteins (with plate method previously discussed), avoidance of red meat and high fructose corn syrup, and avoidance of calorie-containing beverages. I recommended moderate intensity exercise for a minimum of 20-30 minutes at least 3 times per week. These changes have been shown to lead to regression or even resolution of steatosis, inflammation, and even fibrosis in some patients.  Lipid panel (6/2023) with elevated LDL cholesterol of 141 mg/dL. Given associated risk of cardiovascular disease in patients with CASTELLON, he would benefit from statin therapy.  He is HAV immune but HBV non-immune and would benefit from vaccinations.  Given concern for possible advanced fibrosis, Mr. MANCILLA was counseled to: abstain from alcohol; avoid use of herbal and dietary supplements due to potential hepatotoxicity; limit use of acetaminophen to <2 grams per day; avoid use of nonsteroidal antiinflammatory drugs (NSAIDs) as these can lead to diuretic resistance and precipitate renal dysfunction in patients with advanced liver disease; avoid eating any unpasteurized dairy products; avoid eating any raw or undercooked eggs, fish, poultry, or meat; and avoid eating raw/steamed oysters or other shellfish to avoid risk of Vibrio infection.  Next follow-up: 6 months

## 2023-09-10 NOTE — HISTORY OF PRESENT ILLNESS
[de-identified] : Mr. 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  PCP: Dr. Satinder Mcwilliams GI: Dr. Hill Whitt Rheumatology: Dr. Yoni Reilly = referring physician  FibroScan (21, [facility]): Median liver stiffness 7.6 kPA (consistent with F1-2 fibrosis) and CAP score 319 dB/m (consistent with S1 steatosis).  Abdominal US (21): Increased hepatic echogenicity with areas of focal fat sparing near the gallbladder.  MRI abdomen with and without contrast with MR elastography (10/7/22): Normal liver morphology. Diffuse moderate to severe steatosis (with hepatic fat fraction 21%) with focal sparing of the gallbladder fossa. No focal hepatic lesion. No hepatic iron deposition. Hepatic stiffness 4.0 kPA (consistent with F3-4 fibrosis). Normal caliber bile ducts. Normal gallbladder. Normal spleen. No ascites. Patent hepatic and portal veins.  FibroScan (23): Median liver stiffness 5.3 kPA (consistent with F0-1 fibrosis) and CAP score 275 dB/m (consistent with S0-1 steatosis).  He was last seen by me on 23 and is here today for follow-up and FibroScan (above). He remains off allopurinol. No new medications. He is trying to eat healthy and also recently started drinking a low calorie juice containing jennifer and cayenne pepper that he feels helps curb his appetite. He is walking 1.5-2 miles/day with his suárez doodle puppy, but has not been able to consistently do any moderate intensity exercise or weight training due to frequent bouts of tendonitis and plantar fasciitis.  He used to drink alcohol socially but cut down significantly after being diagnosed with gout. Nowadays, he typically has 1-2 glasses of wine every 1-2 months.   He eats vegetarian on  and  for [employer] reasons, but otherwise often eats meals consisting of chicken and vegetables at home. He does most of the cooking for his family.  He has no known family history of liver disease or diabetes. His father is alive and has hypertension. His mother is alive and has arthritis. His sister is a breast cancer survivor. His brother is alive and healthy. His son is healthy. His maternal grandfather  in his 50s of MI and also had a history of heavy alcohol consumption.  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 He is  and lives with his wife and their soon-to-be 4-year-old son.

## 2023-09-11 PROBLEM — K75.81 NONALCOHOLIC STEATOHEPATITIS (NASH): Status: ACTIVE | Noted: 2022-08-23

## 2024-04-16 ENCOUNTER — APPOINTMENT (OUTPATIENT)
Dept: DERMATOLOGY | Facility: CLINIC | Age: 44
End: 2024-04-16
Payer: COMMERCIAL

## 2024-04-16 DIAGNOSIS — L90.5 SCAR CONDITIONS AND FIBROSIS OF SKIN: ICD-10-CM

## 2024-04-16 DIAGNOSIS — L73.1 PSEUDOFOLLICULITIS BARBAE: ICD-10-CM

## 2024-04-16 DIAGNOSIS — D23.9 OTHER BENIGN NEOPLASM OF SKIN, UNSPECIFIED: ICD-10-CM

## 2024-04-16 PROCEDURE — 99203 OFFICE O/P NEW LOW 30 MIN: CPT

## 2024-04-16 RX ORDER — CLINDAMYCIN PHOSPHATE 10 MG/ML
1 LOTION TOPICAL
Qty: 1 | Refills: 11 | Status: ACTIVE | COMMUNITY
Start: 2024-04-16 | End: 1900-01-01

## 2024-04-25 NOTE — ASSESSMENT
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 I have discussed the goals of therapy with the patient. We have discussed treatment options and expectations from treatment.  - START clindamycin 1% lotion to AA qAM - START benzoyl peroxide 4% wash 2-3x weekly in the shower. Rinse after 5-10 minutes. SED.  #scars and dermatofibroma on the right ankle - I have discussed the benign nature and usual course with the patient, reassured.  RTC as needed  Statement Selected

## 2024-04-25 NOTE — PHYSICAL EXAM
[FreeTextEntry3] : scattered pink papules along beard distribution  violaceous smooth papules x3 on the right ankle

## 2024-04-25 NOTE — HISTORY OF PRESENT ILLNESS
[FreeTextEntry1] : NV bumps  [de-identified] : PORTIA MANCILLA is a 44 year old male presenting for:  1. Ingrown hairs on cheeks for 5 years. Using potato juice and coconut oil. Has also used clindamycin and panoxyl in the past with improvement of symptoms but dried out skin.  2. Small asymptomatic bumps on right ankle. Superior bump has been present for 10+ years.

## 2024-10-04 ENCOUNTER — APPOINTMENT (OUTPATIENT)
Dept: HEPATOLOGY | Facility: CLINIC | Age: 44
End: 2024-10-04
Payer: COMMERCIAL

## 2024-10-04 VITALS
RESPIRATION RATE: 16 BRPM | HEART RATE: 82 BPM | TEMPERATURE: 98.3 F | OXYGEN SATURATION: 97 % | BODY MASS INDEX: 30.01 KG/M2 | WEIGHT: 198 LBS | DIASTOLIC BLOOD PRESSURE: 107 MMHG | SYSTOLIC BLOOD PRESSURE: 161 MMHG | HEIGHT: 68 IN

## 2024-10-04 DIAGNOSIS — K75.81 NONALCOHOLIC STEATOHEPATITIS (NASH): ICD-10-CM

## 2024-10-04 DIAGNOSIS — M10.9 GOUT, UNSPECIFIED: ICD-10-CM

## 2024-10-04 LAB
AFP-TM SERPL-MCNC: 2.1 NG/ML
BASOPHILS # BLD AUTO: 0.05 K/UL
BASOPHILS NFR BLD AUTO: 0.9 %
EOSINOPHIL # BLD AUTO: 0.26 K/UL
EOSINOPHIL NFR BLD AUTO: 4.8 %
HCT VFR BLD CALC: 45.1 %
HGB BLD-MCNC: 16 G/DL
IMM GRANULOCYTES NFR BLD AUTO: 0.2 %
LYMPHOCYTES # BLD AUTO: 2.35 K/UL
LYMPHOCYTES NFR BLD AUTO: 43 %
MAN DIFF?: NORMAL
MCHC RBC-ENTMCNC: 29.5 PG
MCHC RBC-ENTMCNC: 35.5 GM/DL
MCV RBC AUTO: 83.1 FL
MONOCYTES # BLD AUTO: 0.41 K/UL
MONOCYTES NFR BLD AUTO: 7.5 %
NEUTROPHILS # BLD AUTO: 2.38 K/UL
NEUTROPHILS NFR BLD AUTO: 43.6 %
PLATELET # BLD AUTO: 252 K/UL
RBC # BLD: 5.43 M/UL
RBC # FLD: 13.2 %
WBC # FLD AUTO: 5.46 K/UL

## 2024-10-04 PROCEDURE — 99214 OFFICE O/P EST MOD 30 MIN: CPT

## 2024-10-04 RX ORDER — MAGNESIUM 200 MG
200 TABLET ORAL
Refills: 0 | Status: ACTIVE | COMMUNITY
Start: 2024-10-04

## 2024-10-04 RX ORDER — VITAMIN B COMPLEX
CAPSULE ORAL
Refills: 0 | Status: ACTIVE | COMMUNITY
Start: 2024-10-04

## 2024-10-04 RX ORDER — ALLOPURINOL 100 MG/1
100 TABLET ORAL
Refills: 0 | Status: ACTIVE | COMMUNITY
Start: 2024-10-04

## 2024-10-07 LAB
ALBUMIN SERPL ELPH-MCNC: 5 G/DL
ALP BLD-CCNC: 86 U/L
ALT SERPL-CCNC: 59 U/L
ANION GAP SERPL CALC-SCNC: 17 MMOL/L
AST SERPL-CCNC: 28 U/L
BILIRUB DIRECT SERPL-MCNC: 0.3 MG/DL
BILIRUB SERPL-MCNC: 1.7 MG/DL
BUN SERPL-MCNC: 14 MG/DL
CALCIUM SERPL-MCNC: 9.9 MG/DL
CHLORIDE SERPL-SCNC: 102 MMOL/L
CHOLEST SERPL-MCNC: 230 MG/DL
CO2 SERPL-SCNC: 23 MMOL/L
CREAT SERPL-MCNC: 1.24 MG/DL
EGFR: 74 ML/MIN/1.73M2
ESTIMATED AVERAGE GLUCOSE: 108 MG/DL
GLUCOSE SERPL-MCNC: 101 MG/DL
HBA1C MFR BLD HPLC: 5.4 %
HDLC SERPL-MCNC: 42 MG/DL
INR PPP: 0.91 RATIO
LDLC SERPL CALC-MCNC: 149 MG/DL
NONHDLC SERPL-MCNC: 189 MG/DL
POTASSIUM SERPL-SCNC: 4.1 MMOL/L
PROT SERPL-MCNC: 7.6 G/DL
PT BLD: 10.8 SEC
SODIUM SERPL-SCNC: 141 MMOL/L
TRIGL SERPL-MCNC: 220 MG/DL

## 2024-10-08 ENCOUNTER — NON-APPOINTMENT (OUTPATIENT)
Age: 44
End: 2024-10-08

## 2025-01-02 ENCOUNTER — APPOINTMENT (OUTPATIENT)
Dept: MRI IMAGING | Facility: CLINIC | Age: 45
End: 2025-01-02

## 2025-03-21 DIAGNOSIS — K75.81 NONALCOHOLIC STEATOHEPATITIS (NASH): ICD-10-CM

## 2025-03-21 DIAGNOSIS — R74.8 ABNORMAL LEVELS OF OTHER SERUM ENZYMES: ICD-10-CM

## 2025-04-11 ENCOUNTER — APPOINTMENT (OUTPATIENT)
Dept: HEPATOLOGY | Facility: CLINIC | Age: 45
End: 2025-04-11